# Patient Record
Sex: FEMALE | Race: WHITE
[De-identification: names, ages, dates, MRNs, and addresses within clinical notes are randomized per-mention and may not be internally consistent; named-entity substitution may affect disease eponyms.]

---

## 2021-07-28 ENCOUNTER — HOSPITAL ENCOUNTER (OUTPATIENT)
Dept: HOSPITAL 47 - RADUSWWP | Age: 51
Discharge: HOME | End: 2021-07-28
Attending: FAMILY MEDICINE
Payer: COMMERCIAL

## 2021-07-28 DIAGNOSIS — Z88.8: ICD-10-CM

## 2021-07-28 DIAGNOSIS — D17.22: Primary | ICD-10-CM

## 2021-07-28 DIAGNOSIS — Z88.1: ICD-10-CM

## 2021-07-28 NOTE — US
EXAMINATION TYPE: US extremity nonvasc mass  LT

 

DATE OF EXAM: 7/28/2021

 

COMPARISON: NONE

 

CLINICAL HISTORY: D17.22 Benign lipomatous neoplasm of skin and subc. Palpable noted by patient at le
ft upper medial/anterior arm after COVID vaccine approximately 2 to 3 months ago. 

 

Left upper arm US findings at palpable: Solid, oval mass is noted at patient's palpable = 1.9 x 2.6 x
 0.9cm and is isoechoic to surrounding skin and subcutaneous tissue. This has an estimated maximum de
pth of 1 cm. Typically COVID vaccinations are intramuscular, suggesting this is unlikely related to t
he vaccination. Small lipoma could be considered. Follow-up can be performed as clinically indicated.


 

IMPRESSION:  No suspicious mass. A subcutaneous lipoma may be present. This could be followed as clin
ically indicated.

## 2022-05-24 ENCOUNTER — HOSPITAL ENCOUNTER (OUTPATIENT)
Dept: HOSPITAL 47 - RADMAMWWP | Age: 52
Discharge: HOME | End: 2022-05-24
Payer: COMMERCIAL

## 2022-05-24 DIAGNOSIS — Z12.31: Primary | ICD-10-CM

## 2022-05-24 PROCEDURE — 77067 SCR MAMMO BI INCL CAD: CPT

## 2022-05-27 NOTE — MM
Reason for Exam: Screening  (asymptomatic). 

Last mammogram was performed 1 year(s) and 7 month(s) ago. 





Patient History: 

Menarche at age 12. First Full-Term Pregnancy at age 28. Hysterectomy at age 39. Endometrial cancer,

age 32. Currently using Estrogen, beginning at age 43 for 7 years. Hormonal Contraceptives for 10

years from age 20 until age 34.

Maternal cousin had breast cancer, age 35. 





Risk Values: 

Candie 5 year model risk: 1.1%.

NCI Lifetime model risk: 9.7%.





Film Views: 

Bilateral CC views were taken.

Bilateral MLO views were taken.





Prior Study Comparison: 

4/2/2018 Bilateral Screening Mammogram, [facility]. 4/8/2019 Bilateral Screening Mammogram, [facility]. 10/7/2020

Bilateral Screening Mammogram, [facility]. 





Tissue Density: 

There are scattered fibroglandular densities.





Findings: 

Analyzed By CAD. 

There is no suspicious group of microcalcifications or new suspicious mass in either breast. 





Overall Assessment: Benign, BI-RAD 2





Management: 

Screening Mammogram of both breasts in 1 year.

A clinical breast exam by your physician is recommended on an annual basis and results should be

correlated with mammographic findings.



Electronically signed and approved by: Akash Kline M.D. Radiologis

## 2023-07-17 ENCOUNTER — HOSPITAL ENCOUNTER (OUTPATIENT)
Dept: HOSPITAL 47 - RADMAMWWP | Age: 53
Discharge: HOME | End: 2023-07-17
Attending: FAMILY MEDICINE
Payer: COMMERCIAL

## 2023-07-17 DIAGNOSIS — Z80.3: ICD-10-CM

## 2023-07-17 DIAGNOSIS — Z12.31: Primary | ICD-10-CM

## 2023-07-17 PROCEDURE — 77067 SCR MAMMO BI INCL CAD: CPT

## 2023-07-18 NOTE — MM
Reason for Exam: Screening  (asymptomatic). 

Last mammogram was performed 1 year(s) and 2 month(s) ago. 





Patient History: 

Menarche at age 12. First Full-Term Pregnancy at age 28. Hysterectomy at age 39. Endometrial cancer,

age 32. Currently using Estrogen, beginning at age 43 for 7 years. Hormonal Contraceptives for 10

years from age 20 until age 34.

Maternal cousin had breast cancer, age 35. 





Risk Values: 

Candie 5 year model risk: 1.2%.

NCI Lifetime model risk: 9.4%.





Prior Study Comparison: 

4/8/2019 Bilateral Screening Mammogram, [facility]. 10/7/2020 Bilateral Screening Mammogram, [facility]. 5/24/2022

Bilateral MG screening mammo w CAD, [facility]. 





Tissue Density: 

There are scattered fibroglandular densities.





Findings: 

Analyzed By CAD. 

Chronic bilateral nodularity.

There is no suspicious group of microcalcifications or new suspicious mass in either breast. 





Overall Assessment: Benign, BI-RAD 2





Management: 

Screening Mammogram of both breasts in 1 year.

.



Patient should continue monthly self-breast exams.  A clinical breast exam by your physician is

recommended on an annual basis.

This exam should not preclude additional follow-up of suspicious palpable abnormalities.



Note on Candie scores and lifetime risk:

1. A Candie score greater than 3% is considered moderate risk. If this is the case, consider

specialist referral to assess eligibility for a risk reducing agent.

2. If overall lifetime risk for the development of breast cancer is 20% or higher, the patient may

qualify for future screening with alternating mammogram and breast MRI.



Electronically signed and approved by: Jairon Mckeon M.D. Radiologist

## 2023-09-20 ENCOUNTER — HOSPITAL ENCOUNTER (OUTPATIENT)
Dept: HOSPITAL 47 - 3 N SLEEP | Age: 53
End: 2023-09-20
Payer: COMMERCIAL

## 2023-09-20 DIAGNOSIS — R51.9: ICD-10-CM

## 2023-09-20 DIAGNOSIS — Z87.891: ICD-10-CM

## 2023-09-20 DIAGNOSIS — E78.5: ICD-10-CM

## 2023-09-20 DIAGNOSIS — K21.9: ICD-10-CM

## 2023-09-20 DIAGNOSIS — Z90.711: ICD-10-CM

## 2023-09-20 DIAGNOSIS — G47.33: Primary | ICD-10-CM

## 2023-09-20 DIAGNOSIS — E66.9: ICD-10-CM

## 2023-09-20 DIAGNOSIS — Z48.813: ICD-10-CM

## 2023-09-20 DIAGNOSIS — Z98.890: ICD-10-CM

## 2023-09-20 PROCEDURE — 99211 OFF/OP EST MAY X REQ PHY/QHP: CPT

## 2023-09-20 NOTE — P.SLEEP
History of Present Illness


DATE: 2023





CONSULTATION/NEW PATIENT EVALUATION





HISTORY OF PRESENT ILLNESS/SLEEP-WAKE EVALUATION:   53-year-old lady had been 

evaluated in the sleep center for possible obstructive sleep apnea hypopnea 

syndrome.





SLEEP SCHEDULE: Usually sleep schedule  from 9 PM to 5 AM on weekdays and to 56

AM on weekend.





FALLING ASLEEP:  Patient sometimes has problems with falling asleep, has TV set 

in bedroom.





DURING SLEEP: Patient usually sleeps on the side position with loud snoring and 

multiple awakenings from sleep with several episodes of nocturia.  Positive 

history of sweating, heartburn, dry mouth, grinding teeth during the sleep.  

Positive history of sleep talking.  No history of hypnogogical hallucinations, 

sleep paralysis, or cataplexy.





DURING THE DAY/WAKE STATE:  In the morning patient wake up tired.  Star 

sleepiness scale is 6.  Patient usually doesn't take naps.





PAST MEDICAL HISTORY:   Acid reflux, headaches, hyperlipidemia, asthma in the 

past.





PAST SURGICAL HISTORY:  , partial hysterectomy, surgery for nasal 

polyps.





MEDICATIONS:   Fenofibrate 54 mg once a day, cetirizine 10 mg once a day, o

xybutynin 5 mg once a day, omeprazole 20 mg once a day.





SOCIAL HISTORY:   Positive history of smoking for about 5 years less then half 

pack a day, quit 1 year ago, alcohol consumption occasional.





FAMILY HISTORY: Narcolepsy by her daughter, sleep apnea by her father, 

hypertension.





REVIEW OF SYSTEMS: Loud snoring, multiple awakenings from sleep. No fevers. No 

double vision. No recent chest pain. No shortness of breath. No abdominal pain. 

No bleeding episodes. No blood in urine. No seizure episodes. 





PHYSICAL EXAMINATION: 


GENERAL: A pleasant patient without any distress. 


VITAL SIGNS: BP   125/ 92 , HR  67 , RR  16 , weight  251.6 pounds, height  5 

foot  6-3/4  inches, body mass index  39.6 .


HEENT: PERRLA, EOMI. Evaluation of oropharynx showed tongue protrudes midline, 

low position of soft palate Mallampati  4.


NECK: Supple. No JVD. Thyroid is not palpable.   18 inches in circumference.


LUNGS: Clear to percussion and to auscultation. Good air exchange. No wheezing 

or rhonchi. 


HEART: S1, S2 regular. No murmurs, gallops or rubs. 


ABDOMEN: Soft and nontender. Bowel sounds are present. No organomegaly 

appreciated. 


EXTREMITIES: No clubbing or cyanosis. 


CNS: Awake, alert, and oriented x3. Cranial nerves 2 to 7 intact. There is no 

fasciculation or atrophy noted. No focal deficits observed. 





ASSESSMENT:


1.  Loud snoring, multiple awakenings from sleep, extremely low position of soft

palate Mallampati 4, wide neck 18 inches in circumference.  Obstructive sleep 

apnea hypopnea syndrome.


2.  Headaches.


3.  Acid reflux.


4.  Status post nasal surgery for polyps.


5   status post partial hysterectomy.


6 .  Hyperlipidemia.


7.  Obesity, BMI 39.6





PLAN: 


1.   Home sleep apnea test for evaluation of patient's breathing during sleep. 


2.   Following plan after reading sleep study


3.   Preferable position during sleep on the side. 


4.   No driving if patient feels any sleepiness. Patient is aware of civil and 

criminal liability for unsafe driving. 


5.             Sleep hygiene with regular sleep time for at least 7.5-8 hours.


6.   Watching and losing weight. 





Thank you very much for referring this patient for consultation.





Sincerely,











Josemanuel Ro MD, PhD, FAASM.


Diplomat of American Board of Sleep Medicine,


Sleep Medicine Board by American Board of Medical Specialities


American Board of Internal Medicine


Medical Director of [facility] [place]

















Sleep Note





- Sleep Note


Sleep Note: 


Temperature: 


Pulse Rate: 


Respiratory Rate: 


Blood Pressure: 


SpO2: 


Height: 


Weight: 


BMI: 


Neck Circumference:

## 2023-10-04 ENCOUNTER — HOSPITAL ENCOUNTER (OUTPATIENT)
Dept: HOSPITAL 47 - 3 N SLEEP | Age: 53
End: 2023-10-04
Payer: COMMERCIAL

## 2023-10-04 DIAGNOSIS — G47.33: Primary | ICD-10-CM

## 2024-08-16 ENCOUNTER — HOSPITAL ENCOUNTER (OUTPATIENT)
Dept: HOSPITAL 47 - RADMAMWWP | Age: 54
Discharge: HOME | End: 2024-08-16
Attending: REGISTERED NURSE
Payer: COMMERCIAL

## 2024-08-16 DIAGNOSIS — Z12.31: Primary | ICD-10-CM

## 2024-08-16 DIAGNOSIS — Z80.3: ICD-10-CM

## 2024-08-16 DIAGNOSIS — R92.333: ICD-10-CM

## 2024-08-16 PROCEDURE — 77063 BREAST TOMOSYNTHESIS BI: CPT

## 2024-08-16 PROCEDURE — 77067 SCR MAMMO BI INCL CAD: CPT

## 2024-09-05 NOTE — MM
Reason for Exam: Screening  (asymptomatic). 

Last mammogram was performed 1 year(s) and 1 month(s) ago. 





Patient History: 

Menarche at age 12. First Full-Term Pregnancy at age 28. Hysterectomy at age 39. Endometrial cancer,

age 32. Currently using Estrogen, beginning at age 43 for 7 years. Hormonal Contraceptives for 10

years from age 20 until age 34.

Maternal cousin had breast cancer, age 35. 





Risk Values: 

Candie 5 year model risk: 1.3%.

NCI Lifetime model risk: 9.3%.





Prior Study Comparison: 

10/7/2020 Bilateral Screening Mammogram, [facility]. 5/24/2022 Bilateral MG screening mammo w CAD, [facility].

7/17/2023 Bilateral MG screening mammo w CAD, [facility]. 





Tissue Density: 

There are scattered areas of fibroglandular density.





Findings: 

Analyzed By CAD. 

Chronic bilateral nodularity. There is no suspicious group of microcalcifications or new suspicious

mass in either breast. 





Overall Assessment: Benign, BI-RAD 2





Management: 

Screening Mammogram of both breasts in 1 year.

.



Patient should continue monthly self-breast exams.  A clinical breast exam by your physician is

recommended on an annual basis.

This exam should not preclude additional follow-up of suspicious palpable abnormalities.



Note on Candie scores and lifetime risk:

1. A Candie score greater than 3% is considered moderate risk. If this is the case, consider

specialist referral to assess eligibility for a risk reducing agent.

2. If overall lifetime risk for the development of breast cancer is 20% or higher, the patient may

qualify for future screening with alternating mammogram and breast MRI.



Electronically signed and approved by: Jairon Mckeon M.D. Radiologist

## 2025-07-25 ENCOUNTER — HOSPITAL ENCOUNTER (OUTPATIENT)
Dept: HOSPITAL 47 - RADMAMWWP | Age: 55
Discharge: HOME | End: 2025-07-25
Payer: COMMERCIAL

## 2025-07-25 DIAGNOSIS — Z53.9: Primary | ICD-10-CM
